# Patient Record
Sex: MALE | Race: WHITE | Employment: UNEMPLOYED | URBAN - METROPOLITAN AREA
[De-identification: names, ages, dates, MRNs, and addresses within clinical notes are randomized per-mention and may not be internally consistent; named-entity substitution may affect disease eponyms.]

---

## 2018-05-15 PROBLEM — R63.6 LOW WEIGHT FOR HEIGHT: Status: ACTIVE | Noted: 2018-05-15

## 2018-05-17 PROBLEM — Z85.828 HISTORY OF BASAL CELL CARCINOMA OF SKIN: Status: ACTIVE | Noted: 2018-05-17

## 2018-05-17 PROBLEM — Z79.899 HIGH RISK MEDICATION USE: Status: ACTIVE | Noted: 2018-05-17

## 2018-05-17 PROBLEM — Z12.5 SCREENING PSA (PROSTATE SPECIFIC ANTIGEN): Status: ACTIVE | Noted: 2018-05-17

## 2018-05-17 PROBLEM — Z12.11 SCREENING FOR COLON CANCER: Status: ACTIVE | Noted: 2018-05-17

## 2019-05-21 ENCOUNTER — HOSPITAL ENCOUNTER (OUTPATIENT)
Dept: PHYSICAL THERAPY | Age: 64
Discharge: HOME | End: 2019-05-21
Payer: COMMERCIAL

## 2019-05-21 PROCEDURE — 97112 NEUROMUSCULAR REEDUCATION: CPT

## 2019-05-21 PROCEDURE — 97161 PT EVAL LOW COMPLEX 20 MIN: CPT
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
## 2019-05-23 ENCOUNTER — HOSPITAL ENCOUNTER (OUTPATIENT)
Dept: GENERAL RADIOLOGY | Age: 64
Discharge: HOME | End: 2019-05-23
Payer: COMMERCIAL

## 2019-05-23 DIAGNOSIS — Z87.09 HISTORY OF FREQUENT UPPER RESPIRATORY INFECTION: ICD-10-CM

## 2019-05-23 DIAGNOSIS — R05.9 COUGH: ICD-10-CM

## 2019-05-23 PROCEDURE — 71046 X-RAY EXAM CHEST 2 VIEWS: CPT

## 2019-05-23 NOTE — PROGRESS NOTES
Humberto Magallanes [facility]  : 1955  Primary: Loyd [facility]*  Secondary:  [facility]rapy Center at [address], [address] [place] [address],[address], [place] [address].  [phone]   [phone]     OUTPATIENT PHYSICAL THERAPY: Daily Treatment Note 2019  Visit Count:  1    ICD-10: Treatment Diagnosis: Difficulty in walking, not elsewhere classified (R26.2)  Precautions/Allergies:   Augmentin [amoxicillin-pot clavulanate]; Vicodin [hydrocodone-acetaminophen]; and Codeine   TREATMENT PLAN:  Effective Dates: 2019 TO 2019 (60 days). Frequency/Duration: Up to six appointments if necessary for 60 Day(s)    Pre-treatment Symptoms/Complaints:  Dizziness, imbalance, and difficulty walking  Pain: Initial: Pain Intensity 1: 0  Post Session:  0/10   Medications Last Reviewed:  2019  Updated Objective Findings:  See evaluation note from today  TREATMENT:     NEUROMUSCULAR RE-EDUCATION: (30 minutes):  Exercise/activities per grid below to improve balance and dizziness. Required minimal verbal cues to promote dynamic balance in standing.   Activity   Date  2019 Date Date Date Date Date   Activity/Exercise   Sets/reps/equipment Sets/reps/  equipment Sets/reps/  equipment Sets/reps/  equipment Sets/reps/  equipment Sets/reps/  equipment   Walking with head turns     4 laps        Walking with head up & down     4 laps        Walking with diagonal head turns   4 laps        Step taps             Marching           Sidestepping           Crossovers           [place]           Walking  backwards             Tandem walking           Weaving in/out of cones             Picking up cones             Sports cord             [place] [employer] focusing on ball moving ball clockwise and counterclockwise   10 reps each direction        Figure 8s    2x3 reps        Circles right/left   2x3 reps both directions        Walking with 360 degree turns   2x2 reps both directions Spirals           Weight shifting:    Left & Right             Weight shifting: Forward & Backward              Static Standing Balance             Standing with feet apart             Standing with feet together             Standing with feet semitandem           Standing with feet tandem           Single leg stance           X1/X2 Viewing exercises             Hallpike-[place] testing for BPPV (Benign Paroxysmal Positional Vertigo)             Bueno-Daroff exercises           Canalith Repositioning treatment/Epley Maneuver  for BPPV (Benign Paroxysmal Positional Vertigo)           Smart Equitest Training: See scanned report. [employer]  Treatment/Session Summary:    · Response to Treatment:  Patient reports increased dizziness with exercises. Dizziness decreased quickly with rest.  · Communication/Consultation:  None today  · Equipment provided today:  Home exercise program  · Recommendations/Intent for next treatment session: Next visit will focus on habituation exercises and vestibular exercises. Patient will call to schedule another appointment if needed. Total Treatment Billable Duration:  30 minutes  PT Patient Time In/Time Out  Time In: 0722  Time Out: Laura 64, PT    No future appointments.

## 2019-06-15 PROBLEM — M53.3 COCCYDYNIA: Status: ACTIVE | Noted: 2018-04-10

## 2019-06-15 PROBLEM — R05.8 COUGH PRESENT FOR GREATER THAN 3 WEEKS: Status: ACTIVE | Noted: 2019-06-15

## 2019-06-15 PROBLEM — M53.3 PAIN OF RIGHT SACROILIAC JOINT: Status: ACTIVE | Noted: 2017-12-28

## 2019-06-15 PROBLEM — M72.0 DUPUYTREN'S CONTRACTURE OF HAND: Status: ACTIVE | Noted: 2018-11-16

## 2019-06-15 PROBLEM — M72.0 DUPUYTREN'S CONTRACTURE OF RIGHT HAND: Status: RESOLVED | Noted: 2018-11-16 | Resolved: 2019-06-15

## 2019-06-15 PROBLEM — R07.81 PLEURODYNIA: Status: ACTIVE | Noted: 2019-06-15

## 2019-06-15 PROBLEM — R05.9 COUGH: Status: ACTIVE | Noted: 2019-06-15

## 2019-06-17 ENCOUNTER — HOSPITAL ENCOUNTER (OUTPATIENT)
Dept: GENERAL RADIOLOGY | Age: 64
Discharge: HOME | End: 2019-06-17
Payer: COMMERCIAL

## 2019-06-17 DIAGNOSIS — H92.01 MASTOID PAIN, RIGHT: ICD-10-CM

## 2019-06-17 PROCEDURE — 70250 X-RAY EXAM OF SKULL: CPT

## 2019-07-03 ENCOUNTER — HOSPITAL ENCOUNTER (OUTPATIENT)
Dept: LAB | Age: 64
Discharge: HOME | End: 2019-07-03
Payer: COMMERCIAL

## 2019-07-03 DIAGNOSIS — J98.8 RECURRENT RESPIRATORY INFECTION: ICD-10-CM

## 2019-07-03 PROCEDURE — 82784 ASSAY IGA/IGD/IGG/IGM EACH: CPT

## 2019-07-03 PROCEDURE — 82785 ASSAY OF IGE: CPT

## 2019-07-03 PROCEDURE — 36415 COLL VENOUS BLD VENIPUNCTURE: CPT

## 2019-07-03 PROCEDURE — 82787 IGG 1 2 3 OR 4 EACH: CPT

## 2019-07-04 LAB
IGG SERPL-MCNC: 613 MG/DL (ref 700–1600)
IGG1 SER-MCNC: 352 MG/DL (ref 248–810)
IGG2 SER-MCNC: 133 MG/DL (ref 130–555)
IGG3 SER-MCNC: 14 MG/DL (ref 15–102)
IGG4 SER-MCNC: 5 MG/DL (ref 2–96)

## 2019-07-05 NOTE — PROGRESS NOTES
Discussed results and will proceed with \"HRCT to assess for possible bronchiectasis. PP  Triage, please coordinate within the next month and arrange a f/u appointment afterwards. Thanks.     Phi Young MD

## 2019-07-07 LAB — IGE SERPL-ACNC: 9 IU/ML (ref 6–495)

## 2019-07-12 ENCOUNTER — HOSPITAL ENCOUNTER (OUTPATIENT)
Dept: LAB | Age: 64
Discharge: HOME | End: 2019-07-12
Payer: COMMERCIAL

## 2019-07-12 LAB
BASOPHILS # BLD: 0.1 K/UL (ref 0–0.2)
BASOPHILS NFR BLD: 1 % (ref 0–2)
DIFFERENTIAL METHOD BLD: NORMAL
EOSINOPHIL # BLD: 0.2 K/UL (ref 0–0.8)
EOSINOPHIL NFR BLD: 5 % (ref 0.5–7.8)
ERYTHROCYTE [DISTWIDTH] IN BLOOD BY AUTOMATED COUNT: 13 % (ref 11.9–14.6)
HCT VFR BLD AUTO: 47.9 % (ref 41.1–50.3)
HGB BLD-MCNC: 15.9 G/DL (ref 13.6–17.2)
IMM GRANULOCYTES # BLD AUTO: 0 K/UL (ref 0–0.5)
IMM GRANULOCYTES NFR BLD AUTO: 0 % (ref 0–5)
LYMPHOCYTES # BLD: 1.3 K/UL (ref 0.5–4.6)
LYMPHOCYTES NFR BLD: 30 % (ref 13–44)
MCH RBC QN AUTO: 29.5 PG (ref 26.1–32.9)
MCHC RBC AUTO-ENTMCNC: 33.2 G/DL (ref 31.4–35)
MCV RBC AUTO: 88.9 FL (ref 79.6–97.8)
MONOCYTES # BLD: 0.3 K/UL (ref 0.1–1.3)
MONOCYTES NFR BLD: 7 % (ref 4–12)
NEUTS SEG # BLD: 2.4 K/UL (ref 1.7–8.2)
NEUTS SEG NFR BLD: 57 % (ref 43–78)
NRBC # BLD: 0 K/UL (ref 0–0.2)
PLATELET # BLD AUTO: 191 K/UL (ref 150–450)
PMV BLD AUTO: 11 FL (ref 9.4–12.3)
RBC # BLD AUTO: 5.39 M/UL (ref 4.23–5.6)
WBC # BLD AUTO: 4.3 K/UL (ref 4.3–11.1)

## 2019-07-12 PROCEDURE — 36415 COLL VENOUS BLD VENIPUNCTURE: CPT

## 2019-07-12 PROCEDURE — 82784 ASSAY IGA/IGD/IGG/IGM EACH: CPT

## 2019-07-12 PROCEDURE — 85025 COMPLETE CBC W/AUTO DIFF WBC: CPT
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
## 2019-07-13 ENCOUNTER — HOSPITAL ENCOUNTER (OUTPATIENT)
Dept: CT IMAGING | Age: 64
Discharge: HOME | End: 2019-07-13
Payer: COMMERCIAL

## 2019-07-13 DIAGNOSIS — R06.02 SOB (SHORTNESS OF BREATH): ICD-10-CM

## 2019-07-13 PROCEDURE — 71250 CT THORAX DX C-: CPT

## 2019-07-14 NOTE — PROGRESS NOTES
Please let Mr. Krystin Marcos know his CT did not show definitive bronchiectasis and was reassuringly normal.  Marlee Dawkins MD

## 2019-07-15 ENCOUNTER — HOSPITAL ENCOUNTER (OUTPATIENT)
Dept: PHYSICAL THERAPY | Age: 64
End: 2019-07-15

## 2019-07-15 LAB
IGA SERPL-MCNC: 88 MG/DL (ref 85–499)
IGM SERPL-MCNC: 34 MG/DL (ref 35–242)

## 2019-07-19 NOTE — PROGRESS NOTES
Spoke with the patient in regards to their CT scan results, explained to the patient per Dr. Bg Guo that the CT did not show definitive bronchiectasis and was reassuringly normal.   Patient understood and did not have any further questions or concerns at this time. // Martha LEE

## 2019-09-24 PROBLEM — Z12.5 SCREENING PSA (PROSTATE SPECIFIC ANTIGEN): Status: RESOLVED | Noted: 2018-05-17 | Resolved: 2019-09-24

## 2019-12-29 PROBLEM — R76.8 LOW SERUM IGG FOR AGE: Status: ACTIVE | Noted: 2019-12-29

## 2019-12-29 PROBLEM — F33.0 MILD EPISODE OF RECURRENT MAJOR DEPRESSIVE DISORDER (HCC): Status: ACTIVE | Noted: 2019-12-29

## 2019-12-29 PROBLEM — K21.9 GASTROESOPHAGEAL REFLUX DISEASE: Status: ACTIVE | Noted: 2019-12-29

## 2019-12-29 PROBLEM — F41.9 ANXIETY: Status: ACTIVE | Noted: 2019-12-29

## 2020-07-22 NOTE — PERIOP NOTES
The patient and family have been mailed the information provided by [facility] in regards to resuming surgery during COVID-19. The patient and family have been informed of [facility] procedures to minimize COVID-19 related risk, but that elimination of risk is not possible. The patient and family have been informed of the visitation policy during their surgery - that one visitor is allowed to be with the patient. The patient has been advised to maintain the stay at home order for two weeks prior and two weeks after surgery. The patient and family have been educated to monitor symptoms such as fever, cough (dry or productive), shortness of breath, difficulty breathing, sore throat, laryngitis, headache, fatigue, unexplained soreness, diarrhea, nausea and sudden loss of taste or smell. They have been advised if the patient develops any of these symptoms, they are to contact our office to review and possibly reschedule their surgery. This was discussed in the office with the patient on 7/21/20.

## 2020-07-29 PROBLEM — M19.011 DEGENERATIVE JOINT DISEASE OF RIGHT ACROMIOCLAVICULAR JOINT: Status: ACTIVE | Noted: 2020-07-29

## 2020-07-29 PROBLEM — M75.111 INCOMPLETE RUPTURE OF RIGHT ROTATOR CUFF: Status: ACTIVE | Noted: 2020-07-29

## 2020-07-29 PROBLEM — S43.431A SUPERIOR GLENOID LABRUM LESION OF RIGHT SHOULDER: Status: ACTIVE | Noted: 2020-07-29

## 2020-07-29 PROBLEM — M75.21 BICIPITAL TENDINITIS OF RIGHT SHOULDER: Status: ACTIVE | Noted: 2020-07-29

## 2020-07-29 PROBLEM — M75.01 ADHESIVE CAPSULITIS OF RIGHT SHOULDER: Status: ACTIVE | Noted: 2020-07-29

## 2020-07-29 NOTE — H&P
[facility] HISTORY AND PHYSICAL    Subjective:     Patient is a 62 y.o. RHD MALE WITH RIGHT SHOULDER PAIN. SEE OFFICE NOTE.     Patient Active Problem List    Diagnosis Date Noted    Incomplete rupture of right rotator cuff 07/29/2020    Bicipital tendinitis of right shoulder 07/29/2020    Superior glenoid labrum lesion of right shoulder 07/29/2020    Degenerative joint disease of right acromioclavicular joint 07/29/2020    Adhesive capsulitis of right shoulder 07/29/2020    Anxiety 12/29/2019    Mild episode of recurrent major depressive disorder ([address] 12/29/2019    Low serum IgG for age 12/29/2019    Gastroesophageal reflux disease 12/29/2019    Pleurodynia 06/15/2019    Cough present for greater than 3 weeks 06/15/2019    High risk medication use 05/17/2018    History of basal cell carcinoma of skin 05/17/2018    Underweight 05/15/2018    Coccydynia 04/10/2018    Pain of right sacroiliac joint 12/28/2017    Benign paroxysmal positional vertigo due to bilateral vestibular disorder 05/29/2016    Mild intermittent asthma without complication 48/48/1470    History of herpes simplex infection 05/25/2016    Osteopenia 05/25/2016    Seasonal allergic rhinitis due to pollen 05/25/2016    DDD (degenerative disc disease), lumbosacral 03/07/2016    Bilateral low back pain without sciatica 03/07/2016    Spondylosis of lumbar region without myelopathy or radiculopathy 03/07/2016    Nausea without vomiting 10/07/2008    Dysphagia 10/07/2008     Past Medical History:   Diagnosis Date    Asthma     exercise induced    BPPV (benign paroxysmal positional vertigo)     Gastritis     Hypotension, postural     Musculoskeletal disorder     DDD Lumbar      Past Surgical History:   Procedure Laterality Date    HX ORTHOPAEDIC      IDET Lumbar spine    HX ORTHOPAEDIC  12/2018    right hand    HX SKIN BIOPSY Right 12/2018    right shoulder      Prior to Admission medications    Medication Sig Start Date End Date Taking? Authorizing Provider   acyclovir (ZOVIRAX) 400 mg tablet Take 1 Tab by mouth daily. 6/17/20   [facility], DO   diazePAM (VALIUM) 5 mg tablet TAKE 1 TABLET BY MOUTH EVERY 6 HOURS AS NEEDED FOR ANXIETY *MAX DAILY:20 MG* 3/17/20   [facility], DO   diclofenac (VOLTAREN) 1 % gel Apply  to affected area. 12/4/17   Provider, Historical   ofloxacin (FLOXIN) 0.3 % otic solution 2-3 gtts in ears TID for 5 days 2/13/20   Fany Holbrook [place], DO   HIZENTRA 10 gram/50 mL (20 %) soln Infusion weekly 1/24/20   Provider, Historical   diphenhydrAMINE (BENADRYL ALLERGY) 25 mg tablet Take 50 mg by mouth every seven (7) days. 50 mg weekly prior to infusion    Provider, Historical   famotidine (PEPCID) 20 mg tablet Take 20 mg by mouth two (2) times a day. Provider, Historical   fexofenadine (ALLEGRA) 180 mg tablet Take  by mouth. Provider, Historical   fluticasone propionate (FLONASE ALLERGY RELIEF) 50 mcg/actuation nasal spray 2 Sprays by Both Nostrils route daily. Provider, Historical   albuterol (PROAIR HFA) 90 mcg/actuation inhaler Take 2 Puffs by inhalation every four (4) hours as needed for Wheezing or Shortness of Breath. 5/16/18   [facility], DO   meclizine (ANTIVERT) 25 mg tablet Take 1 Tab by mouth three (3) times daily as needed. 5/15/18   [facility], DO   lidocaine (LIDODERM) 5 % by TransDERmal route every twenty-four (24) hours. Apply patch to the affected area for 12 hours a day and remove for 12 hours a day.     Provider, Historical     Allergies   Allergen Reactions    Amoxicillin-Pot Clavulanate Diarrhea     Diarrhea and constipation     Nsaids (Non-Steroidal Anti-Inflammatory Drug) Diarrhea and Nausea and Vomiting    Vicodin [Hydrocodone-Acetaminophen] Nausea Only    Lexapro [Escitalopram Oxalate] Nausea Only    Tylenol [Acetaminophen] Nausea Only    Codeine Other (comments) and Unknown (comments)     Pt reports he not all allergic but not sure Social History     Tobacco Use    Smoking status: Never Smoker    Smokeless tobacco: Never Used   Substance Use Topics    Alcohol use: No     Alcohol/week: 0.0 standard drinks     Frequency: Never      Family History   Problem Relation Age of Onset    No Known Problems Mother     Diabetes Father 48    Stroke Father 79      Review of Systems  A comprehensive review of systems was negative except for that written in the HPI. Objective:     No data found. Visit Vitals  Ht 5' 8\" (1.727 m)   Wt 54.9 kg (121 lb)   BMI 18.40 kg/m²     General:  Alert, cooperative, no distress, appears stated age. Head:  Normocephalic, without obvious abnormality, atraumatic. Back:   Symmetric, no curvature. ROM normal. No CVA tenderness. Lungs:   Clear to auscultation bilaterally. Chest wall:  No tenderness or deformity. Heart:  Regular rate and rhythm, S1, S2 normal, no murmur, click, rub or gallop. Extremities: Extremities normal, atraumatic, no cyanosis or edema. Pulses: 2+ and symmetric all extremities. Skin: Skin color, texture, turgor normal. No rashes or lesions   Lymph nodes: Cervical, supraclavicular, and axillary nodes normal.   Neurologic: CNII-XII intact. Normal strength, sensation and reflexes throughout. Assessment:     Principal Problem:    Incomplete rupture of right rotator cuff (7/29/2020)    Active Problems:    Bicipital tendinitis of right shoulder (7/29/2020)      Superior glenoid labrum lesion of right shoulder (7/29/2020)      Degenerative joint disease of right acromioclavicular joint (7/29/2020)      Adhesive capsulitis of right shoulder (7/29/2020)        Plan:     The various methods of treatment have been discussed with the patient and family. PATIENT HAS EXHAUSTED NON-OPERATIVE MODALITIES     After consideration of risks, benefits and other options for treatment, the patient has consented to surgical intervention.     SEE OFFICE Lance Miller MD

## 2020-07-29 NOTE — BRIEF OP NOTE
BRIEF OPERATIVE NOTE    Date of Procedure: 8/7/2020     Preoperative Diagnosis:  PARTIAL THICKNESS ROTATOR CUFF TEAR RIGHT SHOULDER      BICEPS TENDINITIS RIGHT SHOULDER      SLAP TEAR RIGHT SHOULDER      ADHESIVE CAPSULITIS RIGHT SHOULDER      AC OA RIGHT SHOULDER    Postoperative Diagnosis:  SAME    Procedure(s): EXAMINATION AND MANIPULATION RIGHT SHOULDER ARTHROSCOPY RIGHT SHOULDER ARTHROSCOPIC SUBACROMIAL DECOMPRESSION, DISTAL CLAVICLE RESECTION, EXTENSIVE DEBRIDEMENT SLAP TEAR, GLENOHUMERAL JOINT, SUBACROMIAL SPACE, LYSIS OF ADHESIONS, MINI OPEN ROTATOR CUFF REPAIR, BICEPS TENODESIS    Surgeon(s) and Role:     * Liat Harris MD - Primary             Surgical Staff:  Circ-1: (Unknown)  Scrub Tech-1: (Unknown)  Scrub Tech-2: (Unknown)  Scrub Tech-3: (Unknown)  No case tracking events are documented in the log. Anesthesia:  GENERAL WITH INTERSCALENE BLOCK    Estimated Blood Loss: 30 cc. Complications: NONE    Implants:   Implant Name Type Inv.  Item Serial No.  Lot No. LRB No. Used Action   ANCHOR SUT 5.5MM W/NDL PEEK ZP --  - [id number]  ANCHOR SUT 5.5MM W/NDL PEEK ZP --  [id number] [employer] [id number] Right 2 Implanted   ANCHOR SUT 2.3MM W/2.0MM BRAID - [id number]  ANCHOR SUT 2.3MM W/2.0MM BRAID [id number] [employer] [id number] Right 1 Implanted       Oscar James MD

## 2020-07-31 ENCOUNTER — HOSPITAL ENCOUNTER (OUTPATIENT)
Dept: SURGERY | Age: 65
Discharge: HOME | End: 2020-07-31

## 2020-07-31 VITALS — BODY MASS INDEX: 17.88 KG/M2 | WEIGHT: 118 LBS | HEIGHT: 68 IN

## 2020-07-31 NOTE — PERIOP NOTES
Patient verified name and . Order for consent found in EHR and matches case posting; patient verifies procedure. right shoulder exam under anesthesia and manipulation, shoulder arthroscopy, arthroscopic subacromial decompression, arthroscopic distal clavicle resection, lysis of adhesions, arthroscopic versus mini open rotator cuff repair and biceps tenodesis. Type 1B surgery, PAT phone assessment complete. Orders received. Labs per surgeon: POC glucose DOS. Labs per anesthesia protocol: none. Pt insistent on speaking with anesthesia prior to surgery. Will have anesthesia review chart and notify that pt wants to speak with them prior to surgery. Patient answered medical/surgical history questions at their best of ability. All prior to admission medications documented in [facility]. Patient instructed to take the following medications the day of surgery according to anesthesia guidelines with a small sip of water: allegra and bring albuterol inhaler. Hold all vitamins 7 days prior to surgery and NSAIDS 5 days prior to surgery. Patient instructed on the following:    >A negative Covid result dated 20 found in EHR.   > 1 visitor allowed at this time. >Arrive on 3rd floor at Dr. Fahad Suero office, time of arrival to be called the day before by 1700  >NPO after midnight including gum, mints, and ice chips  >Responsible adult must drive patient to the hospital, stay during surgery, and patient will need supervision 24 hours after anesthesia  >Use antibacterial soap in shower the night before surgery and on the morning of surgery  >All piercings must be removed prior to arrival.    >Leave all valuables (money and jewelry) at home but bring insurance card and ID on  DOS. >Do not wear make-up, nail polish, lotions, cologne, perfumes, powders, or oil on skin.

## 2020-08-03 ENCOUNTER — ANESTHESIA EVENT (OUTPATIENT)
Dept: SURGERY | Age: 65
End: 2020-08-03
Payer: COMMERCIAL

## 2020-08-07 ENCOUNTER — HOSPITAL ENCOUNTER (OUTPATIENT)
Age: 65
Setting detail: OUTPATIENT SURGERY
Discharge: HOME | End: 2020-08-07
Attending: ORTHOPAEDIC SURGERY | Admitting: ORTHOPAEDIC SURGERY
Payer: COMMERCIAL

## 2020-08-07 ENCOUNTER — ANESTHESIA (OUTPATIENT)
Dept: SURGERY | Age: 65
End: 2020-08-07
Payer: COMMERCIAL

## 2020-08-07 VITALS
WEIGHT: 119 LBS | SYSTOLIC BLOOD PRESSURE: 125 MMHG | HEIGHT: 68 IN | OXYGEN SATURATION: 99 % | RESPIRATION RATE: 17 BRPM | BODY MASS INDEX: 18.04 KG/M2 | HEART RATE: 63 BPM | TEMPERATURE: 97.7 F | DIASTOLIC BLOOD PRESSURE: 85 MMHG

## 2020-08-07 LAB — GLUCOSE BLD STRIP.AUTO-MCNC: 98 MG/DL (ref 65–100)

## 2020-08-07 PROCEDURE — 77030020268 HC MISC GENERAL SUPPLY: Performed by: ORTHOPAEDIC SURGERY

## 2020-08-07 PROCEDURE — A4565 SLINGS: HCPCS | Performed by: ORTHOPAEDIC SURGERY

## 2020-08-07 PROCEDURE — 74011000250 HC RX REV CODE- 250: Performed by: ANESTHESIOLOGY

## 2020-08-07 PROCEDURE — 74011000250 HC RX REV CODE- 250: Performed by: NURSE PRACTITIONER

## 2020-08-07 PROCEDURE — 77030002986 HC SUT PROL J&J -A: Performed by: ORTHOPAEDIC SURGERY

## 2020-08-07 PROCEDURE — 74011250636 HC RX REV CODE- 250/636: Performed by: ANESTHESIOLOGY

## 2020-08-07 PROCEDURE — 74011250636 HC RX REV CODE- 250/636: Performed by: NURSE ANESTHETIST, CERTIFIED REGISTERED

## 2020-08-07 PROCEDURE — 77030010509 HC AIRWY LMA MSK TELE -A: Performed by: NURSE ANESTHETIST, CERTIFIED REGISTERED

## 2020-08-07 PROCEDURE — 76210000020 HC REC RM PH II FIRST 0.5 HR: Performed by: ORTHOPAEDIC SURGERY

## 2020-08-07 PROCEDURE — 76210000016 HC OR PH I REC 1 TO 1.5 HR: Performed by: ORTHOPAEDIC SURGERY

## 2020-08-07 PROCEDURE — 76010000161 HC OR TIME 1 TO 1.5 HR INTENSV-TIER 1: Performed by: ORTHOPAEDIC SURGERY

## 2020-08-07 PROCEDURE — 74011250636 HC RX REV CODE- 250/636: Performed by: NURSE PRACTITIONER

## 2020-08-07 PROCEDURE — 76060000033 HC ANESTHESIA 1 TO 1.5 HR: Performed by: ORTHOPAEDIC SURGERY

## 2020-08-07 PROCEDURE — 76942 ECHO GUIDE FOR BIOPSY: CPT | Performed by: ORTHOPAEDIC SURGERY

## 2020-08-07 PROCEDURE — 82962 GLUCOSE BLOOD TEST: CPT

## 2020-08-07 PROCEDURE — 74011000250 HC RX REV CODE- 250: Performed by: ORTHOPAEDIC SURGERY

## 2020-08-07 PROCEDURE — 77030031139 HC SUT VCRL2 J&J -A: Performed by: ORTHOPAEDIC SURGERY

## 2020-08-07 PROCEDURE — 77030003666 HC NDL SPINAL BD -A: Performed by: ORTHOPAEDIC SURGERY

## 2020-08-07 PROCEDURE — C1713 ANCHOR/SCREW BN/BN,TIS/BN: HCPCS | Performed by: ORTHOPAEDIC SURGERY

## 2020-08-07 PROCEDURE — 77030040922 HC BLNKT HYPOTHRM STRY -A: Performed by: ORTHOPAEDIC SURGERY

## 2020-08-07 PROCEDURE — 74011000250 HC RX REV CODE- 250: Performed by: NURSE ANESTHETIST, CERTIFIED REGISTERED

## 2020-08-07 PROCEDURE — 77030017964 HC PRB COAG4 STRY -C: Performed by: ORTHOPAEDIC SURGERY

## 2020-08-07 PROCEDURE — 77030040922 HC BLNKT HYPOTHRM STRY -A: Performed by: NURSE ANESTHETIST, CERTIFIED REGISTERED

## 2020-08-07 PROCEDURE — 76010010054 HC POST OP PAIN BLOCK: Performed by: ORTHOPAEDIC SURGERY

## 2020-08-07 PROCEDURE — 77030002916 HC SUT ETHLN J&J -A: Performed by: ORTHOPAEDIC SURGERY

## 2020-08-07 PROCEDURE — 77030003602 HC NDL NRV BLK BBMI -B: Performed by: NURSE ANESTHETIST, CERTIFIED REGISTERED

## 2020-08-07 PROCEDURE — 77030033005 HC TBNG ARTHSC PMP STRY -B: Performed by: ORTHOPAEDIC SURGERY

## 2020-08-07 DEVICE — 5.5MM PEEK ZIP SUTURE ANCHOR WITH ¿ CIRCLE TAPER NEEDLES, #2 FORCE FIBER
Type: IMPLANTABLE DEVICE | Site: SHOULDER | Status: FUNCTIONAL
Brand: PEEK ZIP

## 2020-08-07 DEVICE — ICONIX 2 NEEDLES WITH INTELLIBRAID TECHNOLOGY, 2.3MM ANCHOR WITH 2 STRANDS #2 FORCE FIBER
Type: IMPLANTABLE DEVICE | Site: SHOULDER | Status: FUNCTIONAL
Brand: ICONIX

## 2020-08-07 RX ORDER — MIDAZOLAM HYDROCHLORIDE 1 MG/ML
2 INJECTION, SOLUTION INTRAMUSCULAR; INTRAVENOUS
Status: COMPLETED | OUTPATIENT
Start: 2020-08-07 | End: 2020-08-07

## 2020-08-07 RX ORDER — SODIUM CHLORIDE 0.9 % (FLUSH) 0.9 %
5-40 SYRINGE (ML) INJECTION EVERY 8 HOURS
Status: DISCONTINUED | OUTPATIENT
Start: 2020-08-07 | End: 2020-08-07 | Stop reason: HOSPADM

## 2020-08-07 RX ORDER — MIDAZOLAM HYDROCHLORIDE 1 MG/ML
2 INJECTION, SOLUTION INTRAMUSCULAR; INTRAVENOUS ONCE
Status: DISCONTINUED | OUTPATIENT
Start: 2020-08-07 | End: 2020-08-07 | Stop reason: HOSPADM

## 2020-08-07 RX ORDER — PROPOFOL 10 MG/ML
INJECTION, EMULSION INTRAVENOUS AS NEEDED
Status: DISCONTINUED | OUTPATIENT
Start: 2020-08-07 | End: 2020-08-07 | Stop reason: HOSPADM

## 2020-08-07 RX ORDER — LIDOCAINE HYDROCHLORIDE 10 MG/ML
0.1 INJECTION INFILTRATION; PERINEURAL AS NEEDED
Status: DISCONTINUED | OUTPATIENT
Start: 2020-08-07 | End: 2020-08-07 | Stop reason: HOSPADM

## 2020-08-07 RX ORDER — DIPHENHYDRAMINE HYDROCHLORIDE 50 MG/ML
12.5 INJECTION, SOLUTION INTRAMUSCULAR; INTRAVENOUS
Status: DISCONTINUED | OUTPATIENT
Start: 2020-08-07 | End: 2020-08-07 | Stop reason: HOSPADM

## 2020-08-07 RX ORDER — DEXAMETHASONE SODIUM PHOSPHATE 4 MG/ML
INJECTION, SOLUTION INTRA-ARTICULAR; INTRALESIONAL; INTRAMUSCULAR; INTRAVENOUS; SOFT TISSUE
Status: COMPLETED | OUTPATIENT
Start: 2020-08-07 | End: 2020-08-07

## 2020-08-07 RX ORDER — OXYCODONE HYDROCHLORIDE 5 MG/1
10 TABLET ORAL
Status: DISCONTINUED | OUTPATIENT
Start: 2020-08-07 | End: 2020-08-07 | Stop reason: HOSPADM

## 2020-08-07 RX ORDER — EPHEDRINE SULFATE/0.9% NACL/PF 50 MG/5 ML
SYRINGE (ML) INTRAVENOUS AS NEEDED
Status: DISCONTINUED | OUTPATIENT
Start: 2020-08-07 | End: 2020-08-07 | Stop reason: HOSPADM

## 2020-08-07 RX ORDER — SODIUM CHLORIDE 0.9 % (FLUSH) 0.9 %
5-40 SYRINGE (ML) INJECTION AS NEEDED
Status: DISCONTINUED | OUTPATIENT
Start: 2020-08-07 | End: 2020-08-07 | Stop reason: HOSPADM

## 2020-08-07 RX ORDER — ONDANSETRON 2 MG/ML
INJECTION INTRAMUSCULAR; INTRAVENOUS AS NEEDED
Status: DISCONTINUED | OUTPATIENT
Start: 2020-08-07 | End: 2020-08-07 | Stop reason: HOSPADM

## 2020-08-07 RX ORDER — NALOXONE HYDROCHLORIDE 0.4 MG/ML
0.1 INJECTION, SOLUTION INTRAMUSCULAR; INTRAVENOUS; SUBCUTANEOUS
Status: DISCONTINUED | OUTPATIENT
Start: 2020-08-07 | End: 2020-08-07 | Stop reason: HOSPADM

## 2020-08-07 RX ORDER — LIDOCAINE HYDROCHLORIDE 20 MG/ML
INJECTION, SOLUTION EPIDURAL; INFILTRATION; INTRACAUDAL; PERINEURAL AS NEEDED
Status: DISCONTINUED | OUTPATIENT
Start: 2020-08-07 | End: 2020-08-07 | Stop reason: HOSPADM

## 2020-08-07 RX ORDER — LIDOCAINE HYDROCHLORIDE AND EPINEPHRINE 10; 10 MG/ML; UG/ML
1.5 INJECTION, SOLUTION INFILTRATION; PERINEURAL ONCE
Status: DISCONTINUED | OUTPATIENT
Start: 2020-08-07 | End: 2020-08-07 | Stop reason: HOSPADM

## 2020-08-07 RX ORDER — HYDROMORPHONE HYDROCHLORIDE 2 MG/ML
0.5 INJECTION, SOLUTION INTRAMUSCULAR; INTRAVENOUS; SUBCUTANEOUS
Status: DISCONTINUED | OUTPATIENT
Start: 2020-08-07 | End: 2020-08-07 | Stop reason: HOSPADM

## 2020-08-07 RX ORDER — FENTANYL CITRATE 50 UG/ML
100 INJECTION, SOLUTION INTRAMUSCULAR; INTRAVENOUS ONCE
Status: COMPLETED | OUTPATIENT
Start: 2020-08-07 | End: 2020-08-07

## 2020-08-07 RX ORDER — SODIUM CHLORIDE, SODIUM LACTATE, POTASSIUM CHLORIDE, CALCIUM CHLORIDE 600; 310; 30; 20 MG/100ML; MG/100ML; MG/100ML; MG/100ML
75 INJECTION, SOLUTION INTRAVENOUS CONTINUOUS
Status: DISCONTINUED | OUTPATIENT
Start: 2020-08-07 | End: 2020-08-07 | Stop reason: HOSPADM

## 2020-08-07 RX ORDER — FLUMAZENIL 0.1 MG/ML
0.2 INJECTION INTRAVENOUS AS NEEDED
Status: DISCONTINUED | OUTPATIENT
Start: 2020-08-07 | End: 2020-08-07 | Stop reason: HOSPADM

## 2020-08-07 RX ORDER — OXYCODONE HYDROCHLORIDE 5 MG/1
5 TABLET ORAL
Status: DISCONTINUED | OUTPATIENT
Start: 2020-08-07 | End: 2020-08-07 | Stop reason: HOSPADM

## 2020-08-07 RX ORDER — DEXAMETHASONE SODIUM PHOSPHATE 4 MG/ML
INJECTION, SOLUTION INTRA-ARTICULAR; INTRALESIONAL; INTRAMUSCULAR; INTRAVENOUS; SOFT TISSUE AS NEEDED
Status: DISCONTINUED | OUTPATIENT
Start: 2020-08-07 | End: 2020-08-07 | Stop reason: HOSPADM

## 2020-08-07 RX ORDER — LIDOCAINE HYDROCHLORIDE AND EPINEPHRINE 10; 10 MG/ML; UG/ML
INJECTION, SOLUTION INFILTRATION; PERINEURAL AS NEEDED
Status: DISCONTINUED | OUTPATIENT
Start: 2020-08-07 | End: 2020-08-07 | Stop reason: HOSPADM

## 2020-08-07 RX ADMIN — LIDOCAINE HYDROCHLORIDE 40 MG: 20 INJECTION, SOLUTION EPIDURAL; INFILTRATION; INTRACAUDAL; PERINEURAL at 08:59

## 2020-08-07 RX ADMIN — ONDANSETRON 4 MG: 2 INJECTION INTRAMUSCULAR; INTRAVENOUS at 09:37

## 2020-08-07 RX ADMIN — PROPOFOL 200 MG: 10 INJECTION, EMULSION INTRAVENOUS at 08:59

## 2020-08-07 RX ADMIN — MIDAZOLAM 1 MG: 1 INJECTION INTRAMUSCULAR; INTRAVENOUS at 07:23

## 2020-08-07 RX ADMIN — DEXAMETHASONE SODIUM PHOSPHATE 4 MG: 4 INJECTION, SOLUTION INTRAMUSCULAR; INTRAVENOUS at 09:35

## 2020-08-07 RX ADMIN — SODIUM CHLORIDE, SODIUM LACTATE, POTASSIUM CHLORIDE, AND CALCIUM CHLORIDE 75 ML/HR: 600; 310; 30; 20 INJECTION, SOLUTION INTRAVENOUS at 07:09

## 2020-08-07 RX ADMIN — Medication 2.5 MG: at 09:40

## 2020-08-07 RX ADMIN — ROPIVACAINE HYDROCHLORIDE 30 ML: 150 INJECTION, SOLUTION EPIDURAL; INFILTRATION; PERINEURAL at 07:29

## 2020-08-07 RX ADMIN — WATER 2 G: 1 INJECTION INTRAMUSCULAR; INTRAVENOUS; SUBCUTANEOUS at 08:49

## 2020-08-07 RX ADMIN — FENTANYL CITRATE 50 MCG: 50 INJECTION INTRAMUSCULAR; INTRAVENOUS at 07:24

## 2020-08-07 RX ADMIN — DEXAMETHASONE SODIUM PHOSPHATE 4 MG: 4 INJECTION, SOLUTION INTRAMUSCULAR; INTRAVENOUS at 07:29

## 2020-08-07 NOTE — DISCHARGE INSTRUCTIONS
INSTRUCTIONS FOLLOWING ARTHROSCOPY SURGERY  Dr. Sybil Cruz [phone]    ACTIVITY   As tolerated and as directed by your doctor   Elevate surgery site first 48 hours.  Use arm sling per your doctors instructions. Crycocuff (ice) on as instructed by Dr Sybil Cruz.  Bathe or shower as directed by your doctor. DIET   Clear liquids until no nausea or vomiting; then light diet for the first day   Advance to regular diet on second day, unless your doctor orders otherwise.  If nausea and vomiting continues, call your doctor. PAIN   Take pain medication as directed by your doctor.  Call your doctor if pain is NOT relieved by medication.  DO NOT take aspirin or blood thinners until directed by your doctor. DRESSING CARE: Follow all dressing care instructions provided by Dr. Moises Rivera will be made by nursing staff.  If you have any problems or concerns, call your doctor as needed. CALL YOUR DOCTOR IF   Excessive bleeding that does not stop after holding mild pressure over the area   Temperature of 101°F or above   Redness, excessive swelling or bruising, and/or green or yellow, smelly discharge from incision    AFTER ANESTHESIA   For the next 24 hours: DO NOT Drive, Drink alcoholic beverages, or Make important decisions.  Be aware of dizziness following anesthesia and while taking pain medication. After general anesthesia or intravenous sedation, for 24 hours or while taking prescription Narcotics:  · Limit your activities  · A responsible adult needs to be with you for the next 24 hours  · Do not drive and operate hazardous machinery  · Do not make important personal or business decisions  · Do not drink alcoholic beverages  · If you have not urinated within 8 hours after discharge, please contact your surgeon on call. · If you have sleep apnea and have a CPAP machine, please use it for all naps and sleeping.   · Please use caution when taking narcotics and any of your home medications that may cause drowsiness. *  Please give a list of your current medications to your Primary Care Provider. *  Please update this list whenever your medications are discontinued, doses are      changed, or new medications (including over-the-counter products) are added. *  Please carry medication information at all times in case of emergency situations. These are general instructions for a healthy lifestyle:  No smoking/ No tobacco products/ Avoid exposure to second hand smoke  Surgeon General's Warning:  Quitting smoking now greatly reduces serious risk to your health. Obesity, smoking, and sedentary lifestyle greatly increases your risk for illness  A healthy diet, regular physical exercise & weight monitoring are important for maintaining a healthy lifestyle    You may be retaining fluid if you have a history of heart failure or if you experience any of the following symptoms:  Weight gain of 3 pounds or more overnight or 5 pounds in a week, increased swelling in our hands or feet or shortness of breath while lying flat in bed. Please call your doctor as soon as you notice any of these symptoms; do not wait until your next office visit. Patient Education        Shoulder Arthroscopy: What to Expect at Home  Your Recovery     Arthroscopy is a way to find problems and do surgery inside a joint without making a large cut (incision). Your doctor put a lighted tube with a tiny camera--called an arthroscope, or scope--and surgical tools through small incisions in your shoulder. Your arm may be in a sling. You will feel tired for several days. Your shoulder will be swollen. And you may notice that your skin is a different color near the cuts the doctor made (incisions). Your hand and arm may also be swollen. This is normal and will go away in a few days. Depending on the medicine you had during the surgery, your entire arm may feel numb or like you can't move it.  This goes away in 15 to 24 hours. How soon you can go back to work or your usual routine will depend on your shoulder problem. Most people need 6 weeks or longer to recover. How much time you need depends on the surgery that was done. You may have to limit your activity until your shoulder strength and range of motion are back to normal. You may also be in a rehabilitation program (rehab). If you have a desk job, you may be able to go back to work a few days after the surgery. If you lift things at work, it may take months before you can go back. This care sheet gives you a general idea about how long it will take for you to recover. But each person recovers at a different pace. Follow the steps below to get better as quickly as possible. How can you care for yourself at home? Activity  · Rest when you feel tired. Getting enough sleep will help you recover. You may be more comfortable if you sleep in a reclining chair. To make your arm and shoulder feel better, keep a thin pillow under the back of your arm while you are lying down. · Try to walk each day. Start by walking a little more than you did the day before. Bit by bit, increase the amount you walk. Walking boosts blood flow and helps prevent pneumonia and constipation. · For 2 to 3 weeks, avoid lifting anything heavier than a plate or a glass. You need to give your shoulder time to heal.  · Your arm may be in a sling. You may need to use the sling for a few days to a few weeks. Your doctor will advise you on how long to wear the sling. · You may take the sling off when you dress or wash. · Do not use your arm for repeated movements. These include painting, vacuuming, or using a computer. Diet  · You can eat your normal diet. If your stomach is upset, try bland, low-fat foods like plain rice, broiled chicken, toast, and yogurt. · Drink plenty of fluids, unless your doctor tells you not to.   · You may notice that your bowel movements are not regular right after your surgery. This is common. Try to avoid constipation and straining with bowel movements. You may want to take a fiber supplement every day. If you have not had a bowel movement after a couple of days, ask your doctor about taking a mild laxative. Medicines  · Your doctor will tell you if and when you can restart your medicines. He or she will also give you instructions about taking any new medicines. · If you take aspirin or some other blood thinner, ask your doctor if and when to start taking it again. Make sure that you understand exactly what your doctor wants you to do. · Take pain medicines exactly as directed. ? If the doctor gave you a prescription medicine for pain, take it as prescribed. ? If you are not taking a prescription pain medicine, ask your doctor if you can take an over-the-counter medicine. · If you think your pain medicine is making you sick to your stomach:  ? Take your medicine after meals (unless your doctor has told you not to). ? Ask your doctor for a different pain medicine. · If your doctor prescribed antibiotics, take them as directed. Do not stop taking them just because you feel better. You need to take the full course of antibiotics. Incision care  · If you have a dressing over your incision, keep it clean and dry. You may remove it 2 to 3 days after the surgery. · If your incision is open to the air, keep the area clean and dry. · If you have strips of tape on the incision, leave the tape on for a week or until it falls off. Exercise  · You may need rehabilitation. This is a series of exercises you do after your surgery. Rehab helps you get back your shoulder's range of motion and strength. You will work with your doctor and physical therapist to plan this exercise program. To get the best results, you need to do the exercises correctly and as often and as long as your doctor tells you.   Ice  · To reduce swelling and pain, put ice or a cold pack on your shoulder for 10 to 20 minutes at a time. Do this every 1 to 2 hours. Put a thin cloth between the ice and your skin. Follow-up care is a key part of your treatment and safety. Be sure to make and go to all appointments, and call your doctor if you are having problems. It's also a good idea to know your test results and keep a list of the medicines you take. When should you call for help? [id number] anytime you think you may need emergency care. For example, call if:  · You passed out (lost consciousness). · You have severe trouble breathing. · You have sudden chest pain and shortness of breath, or you cough up blood. Call your doctor now or seek immediate medical care if:  · Your hand is cool, pale, or numb, or it changes color. · You are unable to move your fingers, wrist, or elbow. · You are sick to your stomach or cannot keep fluids down. · You have pain that does not get better after you take pain medicine. · You have signs of infection, such as:  ? Increased pain, swelling, warmth, or redness. ? Red streaks leading from the incision. ? Pus draining from the incision. ? A fever. · You have loose stitches, or your incision comes open. · Your incision bleeds through your first dressing or is still bleeding 3 days after your surgery. Watch closely for changes in your health, and be sure to contact your doctor if:  · Your sling feels too tight, and you cannot loosen it. · You have new or increased swelling in your arm. · You have new pain that develops in another area of the affected limb. For example, you have pain in your hand or elbow. · You do not have a bowel movement after taking a laxative. · You do not get better as expected. Where can you learn more? Go to http://www.gray.com/  Enter V572 in the search box to learn more about \"Shoulder Arthroscopy: What to Expect at Home. \"  Current as of: March 2, 2020               Content Version: 12.5  © 9603-6395 [employer]. Care instructions adapted under license by [employer] (which disclaims liability or warranty for this information). If you have questions about a medical condition or this instruction, always ask your healthcare professional. [address] any warranty or liability for your use of this information.

## 2020-08-07 NOTE — PERIOP NOTES
Patient able to tolerate sitting at [place] without dizziness. Wife helped patient get dressed. Patient able to stand to get in wheelchair without issues. Patient discharged via w/c to car with wife to head home.

## 2020-08-07 NOTE — OP NOTES
[address] [address]  OPERATIVE REPORT    Name:  Teresa Ibarra  MR#:  [id number]  :  1955  ACCOUNT #:  [de-identified]  DATE OF SERVICE:  2020    PREOPERATIVE DIAGNOSES:  1. Partial thickness rotator cuff tear, right shoulder. 2.  Biceps tendinitis, right shoulder. 3.  Superior labrum anterior posterior tear, right shoulder. 4.  Adhesive capsulitis, right shoulder. 5.  Acromioclavicular joint arthritis, right shoulder. POSTOPERATIVE DIAGNOSES:  1. Partial thickness rotator cuff tear, right shoulder. 2.  Biceps tendinitis, right shoulder. 3.  Superior labrum anterior posterior tear, right shoulder. 4.  Adhesive capsulitis, right shoulder. 5.  Acromioclavicular joint arthritis, right shoulder. PROCEDURES PERFORMED:  Examination and manipulation of right shoulder, arthroscopy of right shoulder, arthroscopic subacromial decompression, arthroscopic distal clavicle resection, extensive debridement of SLAP tear, glenohumeral joint, subacromial space, lysis of adhesions, mini-open rotator cuff repair and biceps tenodesis. SURGEON:  Arabella Epstein. Madelyn Anthony MD        ANESTHESIA:  General with an interscalene block. COMPLICATIONS:  None. IMPLANTS:  Hardware utilized are two Archer 5.5 anchors and one Iconix 2.3 anchor. ESTIMATED BLOOD LOSS:  30 mL. PATHOLOGY:  1. Type 2 acromion. 2.  AC joint arthritis. 3.  Type 2 SLAP tear. 4.  Biceps tendinitis. 5.  High-grade partial thickness articular surface supraspinatus rotator cuff tear. 6.  Adhesive capsulitis. CPT CODES:  [id number], [id number], [id number], and [id number]. ICD-10 CODES:  M75.01, M75.21, M75.111, S43.431, and M19.011. INDICATIONS:  This patient is a 78-year-old gentleman who has developed recalcitrant right shoulder pain. Preoperative physical exam, radiographs, and an MR demonstrate a high-grade partial thickness cuff tear, biceps tendinitis, SLAP tear, AC joint arthritis, and adhesive capsulitis.   The patient has exhausted nonoperative modalities and electively admitted for operative intervention. PROCEDURE:  Following identification of the patient, the patient was taken to the operative suite. Following administration of general anesthesia, interscalene block for postop pain control, 2 g of IV Ancef, and measurement of fasting blood glucose of 98, the patient was then positioned on the operative table in the supine fashion. Right shoulder was examined under anesthesia. The patient was noted to have 0-104 degrees of passive forward elevation, 30 degrees of external rotation to the side, and 70 degrees of external and internal rotation in the 90-degree abducted position. A gentle manipulation of the right shoulder was then performed. I was able to achieve 0-180 degrees of passive forward elevation, 60 degrees of external rotation to the side, and 90 degrees of external and internal rotation in the 90-degree abducted position. At this point, the patient was then carefully positioned in the lateral decubitus position left side down. Axillary roll was placed. Beanbag was inflated. Care was taken to pad both dependent lower extremities. Right arm was placed in the H5 traction device in 10 pounds of traction. Right shoulder was then prepped and draped in the sterile fashion. Subacromial space was then injected with 10 mL of 1% Xylocaine with epinephrine. Scope was introduced into the shoulder. Diagnostic arthroscopy was then commenced. Articular surfaces of the humeral head and glenoid were visualized and noted to be intact. Anterior, posterior, superior, and inferior labrum were visualized. There was a high-grade partial thickness articular surface supraspinatus rotator cuff tear with disruption of anatomic footprint. Subscapularis was intact. Posterior aspect of the supraspinatus, infraspinatus, and teres minor were intact. The anterior, posterior, and inferior bands of the IGHL were intact.   The articular cartilage of the humeral head and glenoid were intact. There was a type 2 SLAP tear superiorly with an unstable biceps anchor and marked biceps tendinopathy. Scope was then flip-flopped from the posterior to the anterior portal.  Posterior cuff and labrum were visualized. Type 2 SLAP tear was confirmed. Posterior cuff was intact. Scope was introduced into the subacromial space. Lateral portal was then established. Hypertrophic hemorrhagic bursal tissue was then resected. Bursal side of the cuff was visualized. There was no evident full thickness tear. There was diffuse capsulitis and this was resected in its entirety using a 5.5 full resector. At this point, using an Oratec wand and acromionizing naresh, an arthroscopic subacromial decompression was then performed. This was taken down to the level of the deltoid fascia anteriorly, AC joint posteriorly, contoured from medial to lateral.  Once this was complete, our attention was then turned to resecting the distal clavicle. The distal 10 mm and 10 mm of the distal clavicle was then resected. Care was taken to preserve the posterior superior capsule. At this point, given the combination of pathology, the lateral portal was extended to 3 cm. Deltoid split was carried up to the acromion. The biceps tendon was identified. It was markedly tendinopathic. It was dissected free, brought out to length, tagged, transected, and tenodesed utilizing one 5.5 Greta anchor and one Iconix 2.3 anchor. This yielded an excellent biceps tenodesis which was stable. An additional 5.5 anchor was placed in the greater tuberosity. All limbs of all sutures were passed through the rotator cuff yielding an excellent cuff repair. Scope was then placed back into the glenohumeral joint. Stump of the biceps and SLAP tear were debrided back to stable structures. Anatomic footprint of the rotator cuff was re-established.   There was capsulitis in the glenohumeral joint and this was resected in its entirety using a 5.5 full resector and Oratec wand. There was abundant mobility in the axillary recess. At this point, with the procedure complete,  arthroscopic equipment was removed from the shoulder. The portals were approximated using 2-0 nylon horizontal mattress sutures. The lateral wound was closed with 0 Vicryl figure-of-eight sutures and a 2-0 Prolene subcuticular stitch. A sterile dressing was applied. A sling and swathe was applied. The patient was then transferred to the recovery room in stable condition. MD ISIDRO [id number]  D:  08/07/2020 10:16  T:  08/07/2020 12:02  JOB #:  [id number]  CC:   Karol Pennington MD

## 2020-08-07 NOTE — ANESTHESIA PROCEDURE NOTES
Peripheral Block    Start time: 8/7/2020 7:24 AM  End time: 8/7/2020 7:29 AM  Performed by: Isabela Pierce MD  Authorized by: Isabela Pierce MD       Pre-procedure: Indications: at surgeon's request and post-op pain management    Preanesthetic Checklist: patient identified, risks and benefits discussed, site marked, timeout performed, anesthesia consent given and patient being monitored    Timeout Time: 07:23          Block Type:   Block Type:   Interscalene  Laterality:  Right  Monitoring:  Standard ASA monitoring, continuous pulse ox, frequent vital sign checks, heart rate, oxygen and responsive to questions  Injection Technique:  Single shot  Procedures: ultrasound guided and nerve stimulator    Patient Position: supine (30 degree upright)  Prep: chlorhexidine    Location:  Interscalene  Needle Type:  Stimuplex  Needle Gauge:  21 G  Needle Localization:  Nerve stimulator and ultrasound guidance  Motor Response: minimal motor response >0.4 mA    Motor Response comment:  Motor twitch extinguished between 0.2-0.5 mA    Assessment:  Number of attempts:  1  Injection Assessment:  Incremental injection every 5 mL, negative aspiration for CSF, no paresthesia, ultrasound image on chart, local visualized surrounding nerve on ultrasound, negative aspiration for blood and no intravascular symptoms  Patient tolerance:  Patient tolerated the procedure well with no immediate complications

## 2020-08-07 NOTE — H&P
Date of Surgery Update:  Nay Khalil was seen and examined. History and physical has been reviewed. The patient has been examined.  There have been no significant clinical changes since the completion of the originally dated History and Physical.    Signed By: Kei Albrecht MD     August 7, 2020 7:11 AM

## 2020-08-07 NOTE — ANESTHESIA POSTPROCEDURE EVALUATION
Procedure(s):  EXAMINATION AND MANIPULATION RIGHT SHOULDER ARTHROSCOPY RIGHT SHOULDER ARTHROSCOPIC SUBACROMIAL DECOMPRESSION, DISTAL CLAVICLE RESECTION, EXTENSIVE DEBRIDEMENT SLAP TEAR, GLENOHUMERAL JOINT, SUBACROMIAL SPACE, LYSIS OF ADHESIONS, MINI OPEN ROTATOR CUFF REPAIR, BICEPS TENODESIS. general    Anesthesia Post Evaluation      Multimodal analgesia: multimodal analgesia used between 6 hours prior to anesthesia start to PACU discharge  Patient location during evaluation: PACU  Patient participation: complete - patient participated  Level of consciousness: awake  Pain management: adequate  Airway patency: patent  Anesthetic complications: no  Cardiovascular status: acceptable and hemodynamically stable  Respiratory status: acceptable  Hydration status: acceptable  Comments: Acceptable for discharge from PACU. Post anesthesia nausea and vomiting:  none  Final Post Anesthesia Temperature Assessment:  Normothermia (36.0-37.5 degrees C)      INITIAL Post-op Vital signs:   Vitals Value Taken Time   /80 8/7/2020 10:33 AM   Temp 36.5 °C (97.7 °F) 8/7/2020 10:13 AM   Pulse 75 8/7/2020 10:36 AM   Resp 14 8/7/2020 10:36 AM   SpO2 95 % 8/7/2020 10:36 AM   Vitals shown include unvalidated device data.

## 2020-08-07 NOTE — ANESTHESIA PREPROCEDURE EVALUATION
Relevant Problems   No relevant active problems       Anesthetic History   No history of anesthetic complications            Review of Systems / Medical History  Patient summary reviewed and pertinent labs reviewed    Pulmonary            Asthma : well controlled       Neuro/Psych         Psychiatric history     Cardiovascular  Within defined limits                Exercise tolerance: >4 METS     GI/Hepatic/Renal  Within defined limits              Endo/Other        Arthritis     Other Findings   Comments: Immunodeficiency - Hizentra infusions weekly           Physical Exam    Airway  Mallampati: II  TM Distance: 4 - 6 cm  Neck ROM: normal range of motion   Mouth opening: Normal     Cardiovascular    Rhythm: regular  Rate: normal         Dental  No notable dental hx       Pulmonary  Breath sounds clear to auscultation               Abdominal         Other Findings            Anesthetic Plan    ASA: 2  Anesthesia type: general      Post-op pain plan if not by surgeon: peripheral nerve block single      Anesthetic plan and risks discussed with: Patient

## 2020-08-07 NOTE — PERIOP NOTES
Attempted to get patient to sit at bedside with legs dangled, but c/o dizziness and was unable to sit at bedside. HOB up with a pillow and warm blanket to lumbar area for support and comfort. No pain to right shoulder. Continue to progress patient for discharge. NO nausea with sips of water.

## 2020-12-18 PROBLEM — M25.532 LEFT WRIST PAIN: Status: ACTIVE | Noted: 2020-07-27

## 2021-01-14 PROBLEM — K21.9 GASTROESOPHAGEAL REFLUX DISEASE: Status: RESOLVED | Noted: 2019-12-29 | Resolved: 2021-01-14

## 2021-01-21 PROBLEM — Z12.5 SCREENING PSA (PROSTATE SPECIFIC ANTIGEN): Chronic | Status: ACTIVE | Noted: 2018-05-17

## 2021-01-21 PROBLEM — M75.21 BICIPITAL TENDINITIS OF RIGHT SHOULDER: Status: RESOLVED | Noted: 2020-07-29 | Resolved: 2021-01-21

## 2021-01-21 PROBLEM — G89.29 CHRONIC RIGHT SHOULDER PAIN: Status: ACTIVE | Noted: 2021-01-21

## 2021-01-21 PROBLEM — M75.111 INCOMPLETE RUPTURE OF RIGHT ROTATOR CUFF: Status: RESOLVED | Noted: 2020-07-29 | Resolved: 2021-01-21

## 2021-01-21 PROBLEM — G47.01 INSOMNIA DUE TO MEDICAL CONDITION: Status: ACTIVE | Noted: 2021-01-21

## 2021-01-21 PROBLEM — M25.511 CHRONIC RIGHT SHOULDER PAIN: Status: ACTIVE | Noted: 2021-01-21

## 2021-01-21 PROBLEM — S43.431A SUPERIOR GLENOID LABRUM LESION OF RIGHT SHOULDER: Status: RESOLVED | Noted: 2020-07-29 | Resolved: 2021-01-21

## 2021-01-21 PROBLEM — M25.552 LEFT HIP PAIN: Status: ACTIVE | Noted: 2020-10-01

## 2021-01-21 PROBLEM — M75.01 ADHESIVE CAPSULITIS OF RIGHT SHOULDER: Status: RESOLVED | Noted: 2020-07-29 | Resolved: 2021-01-21

## 2021-04-11 ENCOUNTER — HOSPITAL ENCOUNTER (EMERGENCY)
Age: 66
Discharge: HOME | End: 2021-04-11
Attending: EMERGENCY MEDICINE | Admitting: EMERGENCY MEDICINE
Payer: COMMERCIAL

## 2021-04-11 VITALS
WEIGHT: 117 LBS | SYSTOLIC BLOOD PRESSURE: 130 MMHG | HEART RATE: 99 BPM | HEIGHT: 68 IN | DIASTOLIC BLOOD PRESSURE: 81 MMHG | OXYGEN SATURATION: 99 % | BODY MASS INDEX: 17.73 KG/M2 | RESPIRATION RATE: 16 BRPM | TEMPERATURE: 97.8 F

## 2021-04-11 DIAGNOSIS — H11.422 CHEMOSIS OF CONJUNCTIVA SUBCONJUNCTIVAL EDEMA, LEFT: Primary | ICD-10-CM

## 2021-04-11 PROCEDURE — 99283 EMERGENCY DEPT VISIT LOW MDM: CPT

## 2021-04-12 NOTE — ED TRIAGE NOTES
Pt arrives ambulatory for complaint of left eye pain that started while he was eating dinner. Pt states he abruptly developed subconjunctival hemorrhage. Pt confirms slight irritation to that eye and vision is mildly blurry. Pupil is reactive to light.

## 2021-04-12 NOTE — ED PROVIDER NOTES
71-year-old male presenting for pain in the left eye  Tells me he was at dinner when he sneezed  Few minutes later he began having pain in the left eye  Few minutes after that his wife noted that he had developed a large subconjunctival hemorrhage  Came in for further evaluation  Mild blurriness in that eye but otherwise no vision changes  This is never happened before    The history is provided by the patient. Eye Pain   This is a new problem. The current episode started less than 1 hour ago. The problem occurs constantly. The left eye is affected. The injury mechanism was none. The pain is at a severity of 2/10. The pain is mild. There is no history of trauma to the eye. There is no known exposure to pink eye. He does not wear contacts. Associated symptoms include blurred vision, eye redness and pain. Pertinent negatives include no numbness, no decreased vision, no discharge, no double vision, no foreign body sensation, no photophobia, no nausea, no vomiting, no tingling, no weakness, no itching, no fever, no blindness, no head injury and no dizziness. He has tried nothing for the symptoms. The treatment provided no relief.         Past Medical History:   Diagnosis Date    Asthma     exercise induced    BCC (basal cell carcinoma of skin)     BPPV (benign paroxysmal positional vertigo)     Chronic nausea     Chronic pain     Gastritis     Hypotension, postural     Ill-defined condition     Pt states he has chronic airway irritation     Immunodeficiency (HCC)     Immunoglobulin once weekly     Musculoskeletal disorder     DDD Lumbar       Past Surgical History:   Procedure Laterality Date    HX MALIGNANT SKIN LESION EXCISION      Scalp- BCC     HX ORTHOPAEDIC      IDET Lumbar spine    HX ORTHOPAEDIC  12/2018    right hand    HX SKIN BIOPSY Right 12/2018    right shoulder         Family History:   Problem Relation Age of Onset    No Known Problems Mother     Diabetes Father 48    Stroke Father 79 Social History     Socioeconomic History    Marital status:      Spouse name: Not on file    Number of children: Not on file    Years of education: Not on file    Highest education level: Not on file   Occupational History    Not on file   Social Needs    Financial resource strain: Not on file    Food insecurity     Worry: Not on file     Inability: Not on file    Transportation needs     Medical: Not on file     Non-medical: Not on file   Tobacco Use    Smoking status: Never Smoker    Smokeless tobacco: Never Used   Substance and Sexual Activity    Alcohol use: No     Alcohol/week: 0.0 standard drinks     Frequency: Never    Drug use: No    Sexual activity: Not on file   Lifestyle    Physical activity     Days per week: Not on file     Minutes per session: Not on file    Stress: Not on file   Relationships    Social connections     Talks on phone: Not on file     Gets together: Not on file     Attends Yazidism service: Not on file     Active member of club or organization: Not on file     Attends meetings of clubs or organizations: Not on file     Relationship status: Not on file    Intimate partner violence     Fear of current or ex partner: Not on file     Emotionally abused: Not on file     Physically abused: Not on file     Forced sexual activity: Not on file   Other Topics Concern    Caffeine Concern Not Asked    Back Care Not Asked    Exercise Not Asked    Occupational Exposure Not Asked    Sleep Concern Not Asked    Stress Concern Not Asked    Weight Concern Not Asked   Social History Narrative    Not on file         ALLERGIES: Amoxicillin-pot clavulanate, Nsaids (non-steroidal anti-inflammatory drug), Vicodin [hydrocodone-acetaminophen], Lexapro [escitalopram oxalate], Hydrocodone bitartrate, Tylenol [acetaminophen], and Codeine    Review of Systems   Constitutional: Negative for fever. Eyes: Positive for blurred vision, pain and redness.  Negative for blindness, double vision, photophobia and discharge. Gastrointestinal: Negative for nausea and vomiting. Skin: Negative for itching. Neurological: Negative for dizziness, tingling, weakness and numbness. All other systems reviewed and are negative. Vitals:    04/11/21 2108   BP: 130/81   Pulse: 99   Resp: 16   Temp: 97.8 °F (36.6 °C)   SpO2: 99%   Weight: 53.1 kg (117 lb)   Height: 5' 8\" (1.727 m)            Physical Exam  Vitals signs and nursing note reviewed. Constitutional:       Appearance: He is well-developed. HENT:      Head: Normocephalic and atraumatic. Eyes:      Conjunctiva/sclera: Conjunctivae normal.      Pupils: Pupils are equal, round, and reactive to light. Comments: Large hemorrhagic chemosis   Neck:      Musculoskeletal: Normal range of motion and neck supple. Cardiovascular:      Rate and Rhythm: Normal rate and regular rhythm. Heart sounds: Normal heart sounds. Pulmonary:      Effort: Pulmonary effort is normal.      Breath sounds: Normal breath sounds. Abdominal:      General: Bowel sounds are normal.      Palpations: Abdomen is soft. Musculoskeletal: Normal range of motion. General: No deformity. Skin:     General: Skin is warm and dry. Neurological:      Mental Status: He is alert and oriented to person, place, and time. Cranial Nerves: No cranial nerve deficit. Psychiatric:         Behavior: Behavior normal.          MDM  Number of Diagnoses or Management Options  Diagnosis management comments: Pleasant 81-year-old male presenting for a large left hemorrhagic chemosis of his eye. Will check visual acuities and refer to ophthalmology.     Risk of Complications, Morbidity, and/or Mortality  Presenting problems: moderate  Diagnostic procedures: moderate  Management options: moderate    Patient Progress  Patient progress: improved         Procedures

## 2021-09-10 PROBLEM — M70.62 TROCHANTERIC BURSITIS OF LEFT HIP: Status: ACTIVE | Noted: 2021-02-23

## 2021-09-10 PROBLEM — M72.0 DUPUYTREN'S CONTRACTURE OF HAND: Status: ACTIVE | Noted: 2018-11-16

## 2024-04-29 NOTE — ED NOTES
Billing Type: Third-Party Bill I have reviewed discharge instructions with the patient and spouse. The patient and spouse verbalized understanding. Patient left ED via Discharge Method: ambulatory to Home with self. Opportunity for questions and clarification provided. Patient given 0 scripts. To continue your aftercare when you leave the hospital, you may receive an automated call from our care team to check in on how you are doing.  This is a free service and part of our promise to provide the best care and service to meet your aftercare needs. \" If you have questions, or wish to unsubscribe from this service please call [phone].  Thank you for Choosing our [address] [place] [address] Emergency Department. Bill For Surgical Tray: no Performing Laboratory: [id number] Lab Facility: [id number] Expected Date Of Service: 04/29/2024

## (undated) DEVICE — INFLOW CASSETTE TUBING, DO NOT USE IF PACKAGE IS DAMAGED: Brand: CROSSFLOW

## (undated) DEVICE — STERILE POLYISOPRENE POWDER-FREE SURGICAL GLOVES WITH EMOLLIENT COATING: Brand: PROTEXIS

## (undated) DEVICE — OUTFLOW CASSETTE TUBING, DO NOT USE IF PACKAGE IS DAMAGED: Brand: CROSSFLOW

## (undated) DEVICE — SPONGE GZ W4XL4IN COT 12 PLY TYP VII WVN C FLD DSGN

## (undated) DEVICE — Device

## (undated) DEVICE — BLANKET WRM W40.2XL55.9IN IORT LO BODY + MISTRAL AIR

## (undated) DEVICE — TUBING, SUCTION, 1/4" X 10', STRAIGHT: Brand: MEDLINE

## (undated) DEVICE — STERILE POLYISOPRENE POWDER-FREE SURGICAL GLOVES: Brand: PROTEXIS

## (undated) DEVICE — PUDDLEVAC FLOOR SUCTION DEVICE: Brand: PUDDLEVAC

## (undated) DEVICE — NDL SPNE QNCKE 18GX3.5IN LF --

## (undated) DEVICE — 3M™ STERI-DRAPE™ INSTRUMENT POUCH 1018: Brand: STERI-DRAPE™

## (undated) DEVICE — SUTURE VCRL SZ 0 L27IN ABSRB UD L36MM CP-1 1/2 CIR REV CUT J267H

## (undated) DEVICE — SLING ARM SWTH UNIV FOAM 1 SZ FITS MOST

## (undated) DEVICE — NEEDLE HYPO 18GA L1.5IN PNK S STL HUB POLYPR SHLD REG BVL

## (undated) DEVICE — SHOULDER ARTHRO DR POSTA: Brand: MEDLINE INDUSTRIES, INC.

## (undated) DEVICE — 90-S MAX, SUCTION PROBE, NON-BENDABLE, MAX CUT LEVEL 11: Brand: SERFAS ENERGY

## (undated) DEVICE — SOLUTION IRRIG 3000ML 0.9% SOD CHL FLX CONT 0797208] ICU MEDICAL INC]

## (undated) DEVICE — TOTE STRK SHLD ARTH SCP POSTA --

## (undated) DEVICE — SUTURE PROL SZ 2-0 L18IN NONABSORBABLE BLU FS L26MM 3/8 CIR 8685H

## (undated) DEVICE — SUTURE ETHLN SZ 2-0 L18IN NONABSORBABLE BLK L26MM PS 3/8 585H